# Patient Record
Sex: FEMALE | NOT HISPANIC OR LATINO | Employment: FULL TIME | URBAN - METROPOLITAN AREA
[De-identification: names, ages, dates, MRNs, and addresses within clinical notes are randomized per-mention and may not be internally consistent; named-entity substitution may affect disease eponyms.]

---

## 2017-07-15 ENCOUNTER — HOSPITAL ENCOUNTER (EMERGENCY)
Facility: CLINIC | Age: 52
Discharge: HOME | End: 2017-07-15
Attending: EMERGENCY MEDICINE | Admitting: EMERGENCY MEDICINE
Payer: COMMERCIAL

## 2017-07-15 ENCOUNTER — TRANSFERRED RECORDS (OUTPATIENT)
Dept: HEALTH INFORMATION MANAGEMENT | Facility: CLINIC | Age: 52
End: 2017-07-15

## 2017-07-15 VITALS
TEMPERATURE: 98.1 F | HEART RATE: 86 BPM | WEIGHT: 197 LBS | OXYGEN SATURATION: 98 % | SYSTOLIC BLOOD PRESSURE: 137 MMHG | RESPIRATION RATE: 14 BRPM | DIASTOLIC BLOOD PRESSURE: 90 MMHG

## 2017-07-15 DIAGNOSIS — R60.0 PERIPHERAL EDEMA: ICD-10-CM

## 2017-07-15 LAB
ANION GAP SERPL CALCULATED.3IONS-SCNC: 8 MMOL/L (ref 3–14)
BASOPHILS # BLD AUTO: 0 10E9/L (ref 0–0.2)
BASOPHILS NFR BLD AUTO: 0.4 %
BUN SERPL-MCNC: 11 MG/DL (ref 7–30)
CALCIUM SERPL-MCNC: 9.2 MG/DL (ref 8.5–10.1)
CHLORIDE SERPL-SCNC: 107 MMOL/L (ref 94–109)
CO2 SERPL-SCNC: 25 MMOL/L (ref 20–32)
CREAT SERPL-MCNC: 0.68 MG/DL (ref 0.52–1.04)
DIFFERENTIAL METHOD BLD: ABNORMAL
EOSINOPHIL # BLD AUTO: 0.1 10E9/L (ref 0–0.7)
EOSINOPHIL NFR BLD AUTO: 2.7 %
ERYTHROCYTE [DISTWIDTH] IN BLOOD BY AUTOMATED COUNT: 13.7 % (ref 10–15)
GFR SERPL CREATININE-BSD FRML MDRD: NORMAL ML/MIN/1.7M2
GLUCOSE SERPL-MCNC: 95 MG/DL (ref 70–99)
HCT VFR BLD AUTO: 36.6 % (ref 35–47)
HGB BLD-MCNC: 12.7 G/DL (ref 11.7–15.7)
IMM GRANULOCYTES # BLD: 0 10E9/L (ref 0–0.4)
IMM GRANULOCYTES NFR BLD: 0.4 %
LYMPHOCYTES # BLD AUTO: 0.9 10E9/L (ref 0.8–5.3)
LYMPHOCYTES NFR BLD AUTO: 34.1 %
MCH RBC QN AUTO: 28.8 PG (ref 26.5–33)
MCHC RBC AUTO-ENTMCNC: 34.7 G/DL (ref 31.5–36.5)
MCV RBC AUTO: 83 FL (ref 78–100)
MONOCYTES # BLD AUTO: 0.5 10E9/L (ref 0–1.3)
MONOCYTES NFR BLD AUTO: 18 %
NEUTROPHILS # BLD AUTO: 1.1 10E9/L (ref 1.6–8.3)
NEUTROPHILS NFR BLD AUTO: 44.4 %
NRBC # BLD AUTO: 0 10*3/UL
NRBC BLD AUTO-RTO: 0 /100
NT-PROBNP SERPL-MCNC: 40 PG/ML (ref 0–900)
PLATELET # BLD AUTO: 172 10E9/L (ref 150–450)
POTASSIUM SERPL-SCNC: 4 MMOL/L (ref 3.4–5.3)
RBC # BLD AUTO: 4.41 10E12/L (ref 3.8–5.2)
SODIUM SERPL-SCNC: 140 MMOL/L (ref 133–144)
TROPONIN I SERPL-MCNC: NORMAL UG/L (ref 0–0.04)
WBC # BLD AUTO: 2.6 10E9/L (ref 4–11)

## 2017-07-15 PROCEDURE — 83880 ASSAY OF NATRIURETIC PEPTIDE: CPT | Performed by: EMERGENCY MEDICINE

## 2017-07-15 PROCEDURE — 93005 ELECTROCARDIOGRAM TRACING: CPT

## 2017-07-15 PROCEDURE — 80048 BASIC METABOLIC PNL TOTAL CA: CPT | Performed by: EMERGENCY MEDICINE

## 2017-07-15 PROCEDURE — 84484 ASSAY OF TROPONIN QUANT: CPT | Performed by: EMERGENCY MEDICINE

## 2017-07-15 PROCEDURE — 85025 COMPLETE CBC W/AUTO DIFF WBC: CPT | Performed by: EMERGENCY MEDICINE

## 2017-07-15 PROCEDURE — 99284 EMERGENCY DEPT VISIT MOD MDM: CPT

## 2017-07-15 RX ORDER — LIDOCAINE 40 MG/G
CREAM TOPICAL
Status: DISCONTINUED | OUTPATIENT
Start: 2017-07-15 | End: 2017-07-15 | Stop reason: HOSPADM

## 2017-07-15 ASSESSMENT — ENCOUNTER SYMPTOMS
SHORTNESS OF BREATH: 0
CONSTITUTIONAL NEGATIVE: 1
LIGHT-HEADEDNESS: 1

## 2017-07-15 NOTE — ED PROVIDER NOTES
History     Chief Complaint:  Pedal edema    HPI   Fabienne Majano is a 52 year old female who presents from Urgent Care for evaluation of edema. When patient awoke this morning she noticed swelling to her bilateral hands and legs. She has had pedal edema in the past after traveling and sitting for awhile, but denies recent travel. Symptoms have resolved at time of evaluation here. The patient denies any chest pain, shortness of breath, palpitations, visual disturbance, or nausea. While at Urgent Care she felt mildly lightheaded and EKG showed new T wave inversions V3-6, so she was sent here. Her lightheadedness resolved quickly and was associated with no other symptoms. She voices no further complaints. Feels normal now. No hx of DVT/PE. Has had no exertional symptoms in last several weeks.       CARDIAC RISK FACTORS:  Sex:    female  Tobacco/Illicit drugs: negative   Hypertension:   negative   Hyperlipidemia:  negative   Diabetes:   negative   Family History:  positive for heart disease in mother   Personal History: negative      PE/DVT RISK FACTORS:  Sex:    female   Hormones:   negative   Tobacco:   negative   Cancer:   negative   Travel:   negative   Surgery:   negative   Other immobilization: negative   Personal history:  negative   Family history:  negative       Allergies:  Oxycodone-acetaminophen (nausea only)    Medications:    Zyrtec  Fluticasone     Past Medical History:    Menorrhagia  Pulmonary nodule    Past Surgical History:      Tubal ligation    Family History:    Heart disease (Mother in 40s-50s)    Social History:  The patient denies tobacco, alcohol, or drug use.          Review of Systems   Constitutional: Negative.    Respiratory: Negative for shortness of breath.    Cardiovascular: Negative for chest pain.        Bilateral hand and foot swelling -- resolved; see HPI   Neurological: Positive for light-headedness (resolved).   All other systems reviewed and are negative.      Physical  Exam   First Vitals:  BP: 142/80  Pulse: 86  Heart Rate: 86  Temp: 98.1  F (36.7  C)  Resp: 14  Weight: 89.4 kg (197 lb)  SpO2: 99 %    Physical Exam  General: Resting comfortably on the gurney  Eyes:  The pupils are equal and round  ENT:    Moist mucous membranes  Neck:  Normal range of motion  CV:  Regular rate and rhythm    Skin warm and well perfused   Resp:  Lungs are clear    Non-labored    No rales    No wheezing   GI:  Abdomen is soft, there is no rigidity    No distension    No rebound tenderness     No abdominal tenderness  MS:  Normal muscular tone    No asymmetric leg swelling    No edema visualized   Skin:  No rash or acute skin lesions noted  Neuro:   Awake, alert.      Speech is normal and fluent.    Face is symmetric.     Moves all extremities  Psych:  Normal affect.  Appropriate interactions.    Emergency Department Course   ECG:  ECG (12:04:05):  Indication: leg swelling   Rate 83 bpm. NJ interval 150. QRS duration 82. QT/QTc 354/415. P-R-T axes 48, 68, 2.   normal sinus rhythm  ST & T wave abnormality, consider inferior ischemia.  ST & T wave abnormality, consider anterior ischemia  Abnormal ECG  When compared with ECG from 10/30/03, T wave inversion in V3-V6 now present   Interpreted at 1232 by Isabelle King MD.     Laboratory:  CBC w/diff: WBC 2.6 (L), ANC 1.1 (L), o/w WNL (Hgb 12.7, Plt 172)  BMP: WNL (Cr 0.68)   Troponin-i (at 1226): <0.015   BNP: 40 (WNL)     Emergency Department Course:  Past medical records, nursing notes, and vitals reviewed.  1233: I performed an exam of the patient as documented above.   Peripheral IV access established. Blood drawn and sent. The above EKG and labs were obtained.  1255: Discussed case with nursing staff at the Urgent Care from which patient arrived.  Clinical findings and plan explained to the patient . Patient discharged home with instructions regarding supportive care, medications, and reasons to return as well as the importance of close  follow-up were reviewed.      Impression & Plan    Medical Decision Making:  Fabienne Majano is a 52 year old female who presented to the ED with leg/hand swelling. Vital signs within normal limits. She was concerned about bilateral hand and leg swelling though on evaluation I cannot visualize any swelling and she reports it seems to have gone back to normal. Clinic was concerned about EKG changes. I do note on her EKG that there are subtle T wave inversions in V3-V6 but our last EKG was from early 2000s and no other more recent EKGs. She does not have any chest pain or shortness of breath and does not report any exertional symptoms either to suggest ACS. Labs obtained and her white blood cell count is slightly low at 2.6. Reports history in past of low WBC. BNP, troponin, and BMP are all unremarkable. I do not suspect DVT given the symmetric nature of the swelling and this has now resolved. Recommended follow up with PCP. Unlikely CHF as well given nl BNP, no shortness of breath, no history of this. Reasons to return to ED were discussed with patient.       Diagnosis:    ICD-10-CM    1. Peripheral edema R60.9        Disposition:  discharged to home    Francisco HIDALGO, néstor serving as a scribe at 12:31 PM on 7/15/2017 to document services personally performed by Isabelle King MD based on my observations and the provider's statements to me.      Francisco Alas  7/15/2017    EMERGENCY DEPARTMENT     Isabelle King MD  07/15/17 [id number]

## 2017-07-15 NOTE — ED AVS SNAPSHOT
Emergency Department    [address] [facility] [address]    Phone:  [phone]    Fax:  [phone]                                       Fabienne Majano   MRN: [id number]    Department:   Emergency Department   Date of Visit:  7/15/2017           After Visit Summary Signature Page     I have received my discharge instructions, and my questions have been answered. I have discussed any challenges I see with this plan with the nurse or doctor.    ..........................................................................................................................................  Patient/Patient Representative Signature      ..........................................................................................................................................  Patient Representative Print Name and Relationship to Patient    ..................................................               ................................................  Date                                            Time    ..........................................................................................................................................  Reviewed by Signature/Title    ...................................................              ..............................................  Date                                                            Time

## 2017-07-15 NOTE — DISCHARGE INSTRUCTIONS
Follow up with primary care provider to get the WBC count rechecked    If you do have swelling, elevate your legs        Leg Swelling in Both Legs    Swelling of the feet, ankles, and legs is called edema. It is caused by excess fluid that has collected in the tissues. Extra fluid in the body settles in the lowest part because of gravity. This is why the legs and feet are most affected.  Some of the causes for edema include:    Disease of the heart like congestive heart failure    Standing or sitting for long periods of time    Infection of the feet or legs    Blood pooling in the veins of your legs (venous insufficiency)    Dilated veins in your lower leg (varicose veins)    Garters or other clothing that is tight on your legs. This will cause blood to pool in your legs because the clothing limits blood flow.    Some medicines such as hormones like birth control pills, some blood pressure medicines like calcium channel blockers (amlodipine) and steroids, some antidepressants like MAO inhibitors and tricyclics    Menstrual periods that cause you to retain fluids    Many types of renal disease    Liver failure or cirrhosis    Pregnancy, some swelling is normal, but a sudden increase in leg swelling or weight gain can be a sign of a dangerous complication of pregnancy    Poor nutrition    Thyroid disease  Medical treatment will depend on what is causing the swelling in your legs. Your healthcare provider may prescribe water pills (diuretics) to get rid of the extra fluid.  Home care  Follow these guidelines when caring for yourself at home:    Don't wear clothing like garters that is tight on your legs.    Keep your legs up while lying or sitting.    If infection, injury, or recent surgery is causing the swelling, stay off your legs as much as possible until symptoms get better.    If your healthcare provider says that your leg swelling is caused by venous insufficiency or varicose veins, don't sit or  one  place for long periods of time. Take breaks and walk about every few hours. Brisk walking is a good exercise. It helps circulate the blood that has collected in your leg. Talk with your provider about using support stockings to stop daytime leg swelling.    If your provider says that heart disease is causing your leg swelling, follow a low-salt diet to stop extra fluid from staying in your body. You may also need medicine.  Follow-up care  Follow up with your healthcare provider, or as advised.  When to seek medical advice  Call your healthcare provider right away if any of these occur:    New shortness of breath or chest pain    Shortness of breath or chest pain that gets worse    Swelling in both legs or ankles that gets worse    Swelling of the abdomen    Redness, warmth, or swelling in one leg    Fever of 100.4 F (38 C) or higher, or as directed by your healthcare provider    Yellow color to your skin or eyes    Rapid, unexplained weight gain    Having to sleep upright or use an increased number of pillows  Date Last Reviewed: 3/31/2016    4786-0172 [employer]. [address] [place] [address]129. All rights reserved. This information is not intended as a substitute for professional medical care. Always follow your healthcare professional's instructions.

## 2017-07-15 NOTE — ED AVS SNAPSHOT
Emergency Department    [address] [facility] [address]    Phone:  [phone]    Fax:  [phone]                                       Fabienne Majano   MRN: [id number]    Department:   Emergency Department   Date of Visit:  7/15/2017           Patient Information     Date Of Birth          1965        Your diagnoses for this visit were:     Peripheral edema        You were seen by Isabelle King MD.      Follow-up Information     Follow up with Sindhu Butts PA-C.    Specialty:  Physician Assistant    Contact information:    [facility]  [address]  [facility] [address] [phone]          Follow up with  Emergency Department.    Specialty:  EMERGENCY MEDICINE    Why:  If symptoms worsen    Contact information:    [address] [facility] [address] [phone]        Discharge Instructions       Follow up with primary care provider to get the WBC count rechecked    If you do have swelling, elevate your legs        Leg Swelling in Both Legs    Swelling of the feet, ankles, and legs is called edema. It is caused by excess fluid that has collected in the tissues. Extra fluid in the body settles in the lowest part because of gravity. This is why the legs and feet are most affected.  Some of the causes for edema include:    Disease of the heart like congestive heart failure    Standing or sitting for long periods of time    Infection of the feet or legs    Blood pooling in the veins of your legs (venous insufficiency)    Dilated veins in your lower leg (varicose veins)    Garters or other clothing that is tight on your legs. This will cause blood to pool in your legs because the clothing limits blood flow.    Some medicines such as hormones like birth control pills, some blood pressure medicines like calcium channel blockers (amlodipine) and steroids, some antidepressants like MAO inhibitors and tricyclics    Menstrual periods that cause you to retain  fluids    Many types of renal disease    Liver failure or cirrhosis    Pregnancy, some swelling is normal, but a sudden increase in leg swelling or weight gain can be a sign of a dangerous complication of pregnancy    Poor nutrition    Thyroid disease  Medical treatment will depend on what is causing the swelling in your legs. Your healthcare provider may prescribe water pills (diuretics) to get rid of the extra fluid.  Home care  Follow these guidelines when caring for yourself at home:    Don't wear clothing like garters that is tight on your legs.    Keep your legs up while lying or sitting.    If infection, injury, or recent surgery is causing the swelling, stay off your legs as much as possible until symptoms get better.    If your healthcare provider says that your leg swelling is caused by venous insufficiency or varicose veins, don't sit or  one place for long periods of time. Take breaks and walk about every few hours. Brisk walking is a good exercise. It helps circulate the blood that has collected in your leg. Talk with your provider about using support stockings to stop daytime leg swelling.    If your provider says that heart disease is causing your leg swelling, follow a low-salt diet to stop extra fluid from staying in your body. You may also need medicine.  Follow-up care  Follow up with your healthcare provider, or as advised.  When to seek medical advice  Call your healthcare provider right away if any of these occur:    New shortness of breath or chest pain    Shortness of breath or chest pain that gets worse    Swelling in both legs or ankles that gets worse    Swelling of the abdomen    Redness, warmth, or swelling in one leg    Fever of 100.4 F (38 C) or higher, or as directed by your healthcare provider    Yellow color to your skin or eyes    Rapid, unexplained weight gain    Having to sleep upright or use an increased number of pillows  Date Last Reviewed: 3/31/2016    0079-1362 The  [employer]. [address] [place] [address]993. All rights reserved. This information is not intended as a substitute for professional medical care. Always follow your healthcare professional's instructions.          24 Hour Appointment Hotline       To make an appointment at any [facility], call [phone] ([phone]). If you don't have a family doctor or clinic, we will help you find one. [facility] are conveniently located to serve the needs of you and your family.             Review of your medicines      Notice     You have not been prescribed any medications.            Procedures and tests performed during your visit     Basic metabolic panel    CBC with platelets differential    Nt probnp inpatient    Peripheral IV catheter    Troponin I      Orders Needing Specimen Collection     None      Pending Results     No orders found from 7/13/2017 to 7/16/2017.            Pending Culture Results     No orders found from 7/13/2017 to 7/16/2017.            Pending Results Instructions     If you had any lab results that were not finalized at the time of your Discharge, you can call the ED Lab Result RN at [phone]. You will be contacted by this team for any positive Lab results or changes in treatment. The nurses are available 7 days a week from 10A to 6:30P.  You can leave a message 24 hours per day and they will return your call.        Test Results From Your Hospital Stay        7/15/2017 12:47 PM      Component Results     Component Value Ref Range & Units Status    WBC 2.6 (L) 4.0 - 11.0 10e9/L Final    RBC Count 4.41 3.8 - 5.2 10e12/L Final    Hemoglobin 12.7 11.7 - 15.7 g/dL Final    Hematocrit 36.6 35.0 - 47.0 % Final    MCV 83 78 - 100 fl Final    MCH 28.8 26.5 - 33.0 pg Final    MCHC 34.7 31.5 - 36.5 g/dL Final    RDW 13.7 10.0 - 15.0 % Final    Platelet Count 172 150 - 450 10e9/L Final    Diff Method Automated Method  Final    % Neutrophils 44.4 % Final    %  Lymphocytes 34.1 % Final    % Monocytes 18.0 % Final    % Eosinophils 2.7 % Final    % Basophils 0.4 % Final    % Immature Granulocytes 0.4 % Final    Nucleated RBCs 0 0 /100 Final    Absolute Neutrophil 1.1 (L) 1.6 - 8.3 10e9/L Final    Absolute Lymphocytes 0.9 0.8 - 5.3 10e9/L Final    Absolute Monocytes 0.5 0.0 - 1.3 10e9/L Final    Absolute Eosinophils 0.1 0.0 - 0.7 10e9/L Final    Absolute Basophils 0.0 0.0 - 0.2 10e9/L Final    Abs Immature Granulocytes 0.0 0 - 0.4 10e9/L Final    Absolute Nucleated RBC 0.0  Final         7/15/2017  1:02 PM      Component Results     Component Value Ref Range & Units Status    Sodium 140 133 - 144 mmol/L Final    Potassium 4.0 3.4 - 5.3 mmol/L Final    Chloride 107 94 - 109 mmol/L Final    Carbon Dioxide 25 20 - 32 mmol/L Final    Anion Gap 8 3 - 14 mmol/L Final    Glucose 95 70 - 99 mg/dL Final    Urea Nitrogen 11 7 - 30 mg/dL Final    Creatinine 0.68 0.52 - 1.04 mg/dL Final    GFR Estimate >90  Non  GFR Calc   >60 mL/min/1.7m2 Final    GFR Estimate If Black >90   GFR Calc   >60 mL/min/1.7m2 Final    Calcium 9.2 8.5 - 10.1 mg/dL Final         7/15/2017  1:07 PM      Component Results     Component Value Ref Range & Units Status    Troponin I ES  0.000 - 0.045 ug/L Final    <0.015  The 99th percentile for upper reference range is 0.045 ug/L.  Troponin values in   the range of 0.045 - 0.120 ug/L may be associated with risks of adverse   clinical events.           7/15/2017  1:07 PM      Component Results     Component Value Ref Range & Units Status    N-Terminal Pro BNP Inpatient 40 0 - 900 pg/mL Final    Reference range shown and results flagged as abnormal are suggested inpatient   cut points for confirming diagnosis if CHF in an acute setting. Establishing   a   baseline value for each individual patient is useful for follow-up. An   inpatient or emergency department NT-proPBNP <300 pg/mL effectively rules out   acute CHF, with 99% negative  predictive value.  The outpatient non-acute reference range for ruling out CHF is:   0-125 pg/mL (age 18 to less than 75)   0-450 pg/mL (age 75 yrs and older)                  Clinical Quality Measure: Blood Pressure Screening     Your blood pressure was checked while you were in the emergency department today. The last reading we obtained was  BP: 128/83 . Please read the guidelines below about what these numbers mean and what you should do about them.  If your systolic blood pressure (the top number) is less than 120 and your diastolic blood pressure (the bottom number) is less than 80, then your blood pressure is normal. There is nothing more that you need to do about it.  If your systolic blood pressure (the top number) is 120-139 or your diastolic blood pressure (the bottom number) is 80-89, your blood pressure may be higher than it should be. You should have your blood pressure rechecked within a year by a primary care provider.  If your systolic blood pressure (the top number) is 140 or greater or your diastolic blood pressure (the bottom number) is 90 or greater, you may have high blood pressure. High blood pressure is treatable, but if left untreated over time it can put you at risk for heart attack, stroke, or kidney failure. You should have your blood pressure rechecked by a primary care provider within the next 4 weeks.  If your provider in the emergency department today gave you specific instructions to follow-up with your doctor or provider even sooner than that, you should follow that instruction and not wait for up to 4 weeks for your follow-up visit.        Thank you for choosing [facility]       Thank you for choosing [facility] for your care. Our goal is always to provide you with excellent care. Hearing back from our patients is one way we can continue to improve our services. Please take a few minutes to complete the written survey that you may receive in the mail after you visit with us. Thank  "you!        [employer] Information     [employer] lets you send messages to your doctor, view your test results, renew your prescriptions, schedule appointments and more. To sign up, go to [email or web address] . Click on \"Log in\" on the left side of the screen, which will take you to the Welcome page. Then click on \"Sign up Now\" on the right side of the page.     You will be asked to enter the access code listed below, as well as some personal information. Please follow the directions to create your username and password.     Your access code is: [email or web address]  Expires: 10/13/2017  1:47 PM     Your access code will  in 90 days. If you need help or a new code, please call your [facility] clinic or [phone].        Care EveryWhere ID     This is your Care EveryWhere ID. This could be used by other organizations to access your [facility] medical records  [id number]        Equal Access to Services     NIRAJ ZAMORA : Hadii donovan velasquezo Sonury, waaxda luqadaha, qaybta kaalmada adealisonyaisreal, leah mcallister . So [facility] [phone].    ATENCIÓN: Si habla español, tiene a bryant disposición servicios gratuitos de asistencia lingüística. Llame al [phone].    We comply with applicable federal civil rights laws and [place] laws. We do not discriminate on the basis of race, color, national origin, age, disability sex, sexual orientation or gender identity.            After Visit Summary       This is your record. Keep this with you and show to your community pharmacist(s) and doctor(s) at your next visit.                  "

## 2021-09-01 ENCOUNTER — APPOINTMENT (OUTPATIENT)
Dept: GENERAL RADIOLOGY | Facility: CLINIC | Age: 56
End: 2021-09-01
Attending: EMERGENCY MEDICINE
Payer: COMMERCIAL

## 2021-09-01 ENCOUNTER — HOSPITAL ENCOUNTER (EMERGENCY)
Facility: CLINIC | Age: 56
Discharge: HOME | End: 2021-09-01
Attending: EMERGENCY MEDICINE | Admitting: EMERGENCY MEDICINE
Payer: COMMERCIAL

## 2021-09-01 VITALS
SYSTOLIC BLOOD PRESSURE: 132 MMHG | BODY MASS INDEX: 32.95 KG/M2 | WEIGHT: 205 LBS | DIASTOLIC BLOOD PRESSURE: 85 MMHG | OXYGEN SATURATION: 98 % | HEIGHT: 66 IN | RESPIRATION RATE: 18 BRPM | HEART RATE: 73 BPM | TEMPERATURE: 97 F

## 2021-09-01 DIAGNOSIS — R51.9 FACIAL PAIN: ICD-10-CM

## 2021-09-01 DIAGNOSIS — R05.9 COUGH: ICD-10-CM

## 2021-09-01 DIAGNOSIS — R94.31 ABNORMAL ELECTROCARDIOGRAM: ICD-10-CM

## 2021-09-01 LAB
ALBUMIN SERPL-MCNC: 3.9 G/DL (ref 3.4–5)
ALP SERPL-CCNC: 64 U/L (ref 40–150)
ALT SERPL W P-5'-P-CCNC: 30 U/L (ref 0–50)
ANION GAP SERPL CALCULATED.3IONS-SCNC: 3 MMOL/L (ref 3–14)
AST SERPL W P-5'-P-CCNC: 17 U/L (ref 0–45)
ATRIAL RATE - MUSE: 69 BPM
ATRIAL RATE - MUSE: 73 BPM
BASOPHILS # BLD AUTO: 0 10E3/UL (ref 0–0.2)
BASOPHILS NFR BLD AUTO: 1 %
BILIRUB SERPL-MCNC: 0.5 MG/DL (ref 0.2–1.3)
BUN SERPL-MCNC: 15 MG/DL (ref 7–30)
CALCIUM SERPL-MCNC: 9 MG/DL (ref 8.5–10.1)
CHLORIDE BLD-SCNC: 109 MMOL/L (ref 94–109)
CO2 SERPL-SCNC: 27 MMOL/L (ref 20–32)
CREAT SERPL-MCNC: 0.71 MG/DL (ref 0.52–1.04)
DIASTOLIC BLOOD PRESSURE - MUSE: NORMAL MMHG
DIASTOLIC BLOOD PRESSURE - MUSE: NORMAL MMHG
EOSINOPHIL # BLD AUTO: 0.1 10E3/UL (ref 0–0.7)
EOSINOPHIL NFR BLD AUTO: 3 %
ERYTHROCYTE [DISTWIDTH] IN BLOOD BY AUTOMATED COUNT: 13.7 % (ref 10–15)
GFR SERPL CREATININE-BSD FRML MDRD: >90 ML/MIN/1.73M2
GLUCOSE BLD-MCNC: 100 MG/DL (ref 70–99)
HCT VFR BLD AUTO: 37.2 % (ref 35–47)
HGB BLD-MCNC: 12.1 G/DL (ref 11.7–15.7)
HOLD SPECIMEN: NORMAL
HOLD SPECIMEN: NORMAL
IMM GRANULOCYTES # BLD: 0 10E3/UL
IMM GRANULOCYTES NFR BLD: 0 %
INTERPRETATION ECG - MUSE: NORMAL
INTERPRETATION ECG - MUSE: NORMAL
LYMPHOCYTES # BLD AUTO: 1.3 10E3/UL (ref 0.8–5.3)
LYMPHOCYTES NFR BLD AUTO: 37 %
MCH RBC QN AUTO: 26.9 PG (ref 26.5–33)
MCHC RBC AUTO-ENTMCNC: 32.5 G/DL (ref 31.5–36.5)
MCV RBC AUTO: 83 FL (ref 78–100)
MONOCYTES # BLD AUTO: 0.5 10E3/UL (ref 0–1.3)
MONOCYTES NFR BLD AUTO: 14 %
NEUTROPHILS # BLD AUTO: 1.6 10E3/UL (ref 1.6–8.3)
NEUTROPHILS NFR BLD AUTO: 45 %
NRBC # BLD AUTO: 0 10E3/UL
NRBC BLD AUTO-RTO: 0 /100
P AXIS - MUSE: 53 DEGREES
P AXIS - MUSE: 64 DEGREES
PLATELET # BLD AUTO: 196 10E3/UL (ref 150–450)
POTASSIUM BLD-SCNC: 3.6 MMOL/L (ref 3.4–5.3)
PR INTERVAL - MUSE: 174 MS
PR INTERVAL - MUSE: 182 MS
PROT SERPL-MCNC: 8 G/DL (ref 6.8–8.8)
QRS DURATION - MUSE: 108 MS
QRS DURATION - MUSE: 92 MS
QT - MUSE: 398 MS
QT - MUSE: 404 MS
QTC - MUSE: 432 MS
QTC - MUSE: 438 MS
R AXIS - MUSE: 63 DEGREES
R AXIS - MUSE: 66 DEGREES
RBC # BLD AUTO: 4.49 10E6/UL (ref 3.8–5.2)
SARS-COV-2 RNA RESP QL NAA+PROBE: NEGATIVE
SODIUM SERPL-SCNC: 139 MMOL/L (ref 133–144)
SYSTOLIC BLOOD PRESSURE - MUSE: NORMAL MMHG
SYSTOLIC BLOOD PRESSURE - MUSE: NORMAL MMHG
T AXIS - MUSE: -17 DEGREES
T AXIS - MUSE: -19 DEGREES
TROPONIN I SERPL-MCNC: <0.015 UG/L (ref 0–0.04)
TROPONIN I SERPL-MCNC: <0.015 UG/L (ref 0–0.04)
VENTRICULAR RATE- MUSE: 69 BPM
VENTRICULAR RATE- MUSE: 73 BPM
WBC # BLD AUTO: 3.6 10E3/UL (ref 4–11)

## 2021-09-01 PROCEDURE — 93005 ELECTROCARDIOGRAM TRACING: CPT

## 2021-09-01 PROCEDURE — 36415 COLL VENOUS BLD VENIPUNCTURE: CPT | Performed by: EMERGENCY MEDICINE

## 2021-09-01 PROCEDURE — 93005 ELECTROCARDIOGRAM TRACING: CPT | Mod: 76

## 2021-09-01 PROCEDURE — 87635 SARS-COV-2 COVID-19 AMP PRB: CPT | Performed by: EMERGENCY MEDICINE

## 2021-09-01 PROCEDURE — 250N000013 HC RX MED GY IP 250 OP 250 PS 637: Performed by: EMERGENCY MEDICINE

## 2021-09-01 PROCEDURE — 80053 COMPREHEN METABOLIC PANEL: CPT | Performed by: EMERGENCY MEDICINE

## 2021-09-01 PROCEDURE — 99285 EMERGENCY DEPT VISIT HI MDM: CPT | Mod: 25

## 2021-09-01 PROCEDURE — 85025 COMPLETE CBC W/AUTO DIFF WBC: CPT | Performed by: EMERGENCY MEDICINE

## 2021-09-01 PROCEDURE — 84484 ASSAY OF TROPONIN QUANT: CPT | Mod: 91 | Performed by: EMERGENCY MEDICINE

## 2021-09-01 PROCEDURE — 71045 X-RAY EXAM CHEST 1 VIEW: CPT

## 2021-09-01 PROCEDURE — C9803 HOPD COVID-19 SPEC COLLECT: HCPCS

## 2021-09-01 RX ORDER — ACETAMINOPHEN 500 MG
1000 TABLET ORAL ONCE
Status: COMPLETED | OUTPATIENT
Start: 2021-09-01 | End: 2021-09-01

## 2021-09-01 RX ADMIN — ACETAMINOPHEN 1000 MG: 500 TABLET, FILM COATED ORAL at 08:28

## 2021-09-01 ASSESSMENT — ENCOUNTER SYMPTOMS
HEADACHES: 1
SORE THROAT: 1
NECK PAIN: 1

## 2021-09-01 ASSESSMENT — MIFFLIN-ST. JEOR: SCORE: 1546.62

## 2021-09-01 NOTE — ED TRIAGE NOTES
Patient reports pain behind right ear that radiates into her shoulder. Patient also complains cough and plugged nose. States has a scratchy throat. Symptoms started yesterday.

## 2021-09-01 NOTE — ED PROVIDER NOTES
"  History   Chief Complaint:  Neck Pain and Cough     HPI   Fabienne Majano is a 56 year old female with history of hypertension who presents with neck pain and a cough.  The patient reports that yesterday at work she began having a pain behind her right ear that radiates down to her right shoulder. This pain is worsened with movement, but has not other exacerbating symptoms. She also notes having a scratchy throat this morning as well as a headache and congestion in her left nostril. The patient is vaccinated against COVID-19 and had her last vaccination dose in February of this year. Of note, the patient notes that she has been under a lot of stress as her brother is out of state and currently on hospice care, but she is struggling to find a way to see him.     Review of Systems   HENT: Positive for congestion and sore throat (scratchy).    Musculoskeletal: Positive for neck pain (Right side).   Neurological: Positive for headaches.   All other systems reviewed and are negative.        Allergies:  Atorvastatin  Hydrocodone bitartrate  Oxycodone    Medications:  Norvasc  Aspirin 81 mg   Zyrtec  Albuterol    Past Medical History:    Hypertension  Pulmonary nodule  Menorrhagia  Uterine fibroid     Past Surgical History:    Festus teeth extraction  Hysterectomy     Family History:    Brother: Crohn's disease, heart attack  Father: diabetes  Mother: diabetes, stroke  Sister: hypertension    Social History:  The patient presents alone.  She works as a pharmacy technician.    Physical Exam     Patient Vitals for the past 24 hrs:   BP Temp Temp src Pulse Resp SpO2 Height Weight   09/01/21 1123 -- -- -- -- -- 98 % -- --   09/01/21 1100 126/77 -- -- 75 18 98 % -- --   09/01/21 1031 -- -- -- -- -- 97 % -- --   09/01/21 1030 137/88 -- -- 68 -- -- -- --   09/01/21 1000 130/84 -- -- 71 -- 96 % -- --   09/01/21 0900 120/79 -- -- 75 -- 96 % -- --   09/01/21 0801 129/88 97  F (36.1  C) Temporal 79 16 99 % 1.676 m (5' 6\") 93 kg (205 " lb)       Physical Exam  Vitals: reviewed by me  General: Pt seen on hospital [facility], cooperative, and alert to conversation  Eyes: Tracking well, clear conjunctiva BL  ENT: MMM, midline trachea.  Full range of motion to neck, no tenderness to palpation of the mastoid process on the right.  Neck is soft and supple with no skin changes, and no tenderness.  No masses.  Lungs: No tachypnea, no accessory muscle use. No respiratory distress.   CV: Rate as above  Abd: Soft, non tender, no guarding, no rebound. Non distended  MSK: no joint effusion.  No evidence of trauma  Skin: No rash  Neuro: Clear speech and no facial droop.  Psych: Not RIS, no e/o AH/VH      Emergency Department Course   ECG  ECG taken at 0930, ECG read at 0932  Normal sinus rhythm  Incomplete right bundle branch block  T wave abnormality, consider inferior ischemia  T wave abnormality, consider anterolateral ischemia   Rate 73 bpm. WV interval 174 ms. QRS duration 108 ms. QT/QTc 398/438 ms. P-R-T axes 64 66 -17.     ECG #2  ECG taken at 1110, ECG read at 1125  Normal sinus rhythm  Incomplete right bundle branch block  St & T wave abnormality, consider inferior ischemia  ST & T wave abnormality, consider anterolateral ischemia    Rate 69 bpm. WV interval 182 ms. QRS duration 92 ms. QT/QTc 404/432 ms. P-R-T axes 53 63 -19.     Imaging:  XR Chest 2 Views  Portable AP view of the chest was obtained.  Cardiomediastinal silhouette is within normal limits. No suspicious  focal pulmonary opacities. No significant pleural effusion or  pneumothorax.  Reading per radiology.    Laboratory:  CBC: WBC 3.6 (L), HGB 12.1,    CMP: Glucose 100 (H) o/w WNL (Creatinine 0.71)     Troponin (Collected 0820): <0.015   Troponin (Collected 1039): <0.015     Symptomatic Influenza A/B & SARS-CoV2 (COVID19) Virus PCR Multiplex: Negative       Emergency Department Course:    Reviewed:  I reviewed nursing notes, vitals and past medical  history    Assessments:  0815 I obtained history and examined the patient as noted above.   1124 I rechecked the patient and explained findings. On reassessment the patient is completely pain free.     Interventions:  0828 Tylenol 1000 mg Oral     Disposition:  The patient was discharged to home.       Impression & Plan     Medical Decision Making:  This is a pleasant 56-year-old female presents to emergency room with right-sided postauricular pain, and neck pain.  She also has a cough, and some of the symptoms I don't necessarily go with a cardiac presentation, but that was my main concern today.  Her EKG does have significant ST depression in the lateral leads, although this seems to be nondynamic, and actually was present to a slightly lesser degree on EKG in 2017.  Importantly she also has no chest pain, no exertional symptoms, and negative troponins x2 after over 24 hours of symptoms.  I do think that she is stable for outpatient management, and while the patient herself thinks that this is all stress related to her brother being put on hospice in [place], I have implored her to see her regular doctor for a cardiac stress test in the next 1 week.  She feels comfortable with this plan, tells me she'll come back if things get worse, and understands that no clear cause of her pain was found today, though she may simply have a viral syndrome causing some of the congestion and ear pain.  Her tympanic membranes unremarkable, she has no tenderness to the mastoid process itself, effectively ruling out mastoiditis.  I do think she stable for discharge as above, all of her questions were answered.    Covid-19  Fabienne Majano was evaluated during a global COVID-19 pandemic, which necessitated consideration that the patient might be at risk for infection with the SARS-CoV-2 virus that causes COVID-19.   Applicable protocols for evaluation were followed during the patient's care.   COVID-19 was considered as part of the  patient's evaluation. The plan for testing is:  a test was obtained during this visit.    Diagnosis:    ICD-10-CM    1. Abnormal electrocardiogram  R94.31    2. Facial pain  R51.9    3. Cough  R05        Discharge Medications:  None     Scribe Disclosure:  I, Kenneth Eldridge, am serving as a scribe at 8:05 AM on 9/1/2021 to document services personally performed by Pramod Turner* based on my observations and the provider's statements to me.              Pramod Turner MD  09/01/21 1311

## 2021-09-01 NOTE — DISCHARGE INSTRUCTIONS
As we discussed, no clear cause of your ear pain or cough or neck pain was found today.  We did undertake a thorough cardiac work-up here, and while you have no evidence of cardiac damage at this time, your EKG was abnormal and needs to be followed up on.  Based on our records, it looks to be similar to 2017, which is reassuring, but still you need to follow-up in the next 1 week, this is very important.  Come back to the ER immediately with with any chest pain, or with any pain that returns as you told me you are currently chest pain and neck pain-free at this time.  Come back with any other concerns.

## 2022-01-02 ENCOUNTER — HOSPITAL ENCOUNTER (EMERGENCY)
Facility: CLINIC | Age: 57
Discharge: HOME | End: 2022-01-02
Attending: EMERGENCY MEDICINE | Admitting: EMERGENCY MEDICINE
Payer: COMMERCIAL

## 2022-01-02 VITALS
SYSTOLIC BLOOD PRESSURE: 121 MMHG | BODY MASS INDEX: 34.66 KG/M2 | WEIGHT: 203 LBS | OXYGEN SATURATION: 99 % | DIASTOLIC BLOOD PRESSURE: 64 MMHG | TEMPERATURE: 97.4 F | HEIGHT: 64 IN | RESPIRATION RATE: 20 BRPM | HEART RATE: 85 BPM

## 2022-01-02 DIAGNOSIS — M54.50 ACUTE MIDLINE LOW BACK PAIN WITHOUT SCIATICA: ICD-10-CM

## 2022-01-02 PROCEDURE — 99283 EMERGENCY DEPT VISIT LOW MDM: CPT

## 2022-01-02 PROCEDURE — 250N000013 HC RX MED GY IP 250 OP 250 PS 637: Performed by: EMERGENCY MEDICINE

## 2022-01-02 RX ORDER — LIDOCAINE 4 G/G
1 PATCH TOPICAL
Status: DISCONTINUED | OUTPATIENT
Start: 2022-01-02 | End: 2022-01-02 | Stop reason: HOSPADM

## 2022-01-02 RX ORDER — IBUPROFEN 600 MG/1
600 TABLET, FILM COATED ORAL ONCE
Status: COMPLETED | OUTPATIENT
Start: 2022-01-02 | End: 2022-01-02

## 2022-01-02 RX ORDER — LIDOCAINE 4 G/G
1 PATCH TOPICAL EVERY 24 HOURS
Qty: 15 PATCH | Refills: 0 | Status: SHIPPED | OUTPATIENT
Start: 2022-01-02

## 2022-01-02 RX ORDER — KETOROLAC TROMETHAMINE 10 MG/1
10 TABLET, FILM COATED ORAL EVERY 6 HOURS PRN
Qty: 20 TABLET | Refills: 0 | Status: SHIPPED | OUTPATIENT
Start: 2022-01-02

## 2022-01-02 RX ORDER — ACETAMINOPHEN 325 MG/1
650 TABLET ORAL EVERY 6 HOURS PRN
Qty: 60 TABLET | Refills: 0 | Status: SHIPPED | OUTPATIENT
Start: 2022-01-02

## 2022-01-02 RX ADMIN — LIDOCAINE 1 PATCH: 560 PATCH PERCUTANEOUS; TOPICAL; TRANSDERMAL at 09:54

## 2022-01-02 RX ADMIN — IBUPROFEN 600 MG: 600 TABLET ORAL at 09:54

## 2022-01-02 ASSESSMENT — MIFFLIN-ST. JEOR: SCORE: 1505.8

## 2022-01-02 ASSESSMENT — ENCOUNTER SYMPTOMS
CONSTIPATION: 0
BACK PAIN: 1
DYSURIA: 0
HEMATURIA: 0
DIARRHEA: 0
BLOOD IN STOOL: 0
DIFFICULTY URINATING: 0
NUMBNESS: 0
FREQUENCY: 0

## 2022-01-02 NOTE — ED PROVIDER NOTES
History   Chief Complaint:  Back Pain       The history is provided by the patient.      Fabienne Majano is a 56 year old female with history of hypertension, thoracic back muscle spasm who presents with back pain. The patient reports that she began experiencing medial lower back pain about 2 days ago, which worsened today. She states that she took Cyclobenzaprine for her pain at home, which did not help. She has reportedly not taken Tylenol or Motrin at home today. Of note, the patient reports no history of cancer. She denies steroid use.  She denies blood thinner or IV drug use.  The patient endorses no recent heavy lifting or bending issues. She also notes no rectal numbness, bowel issues, or urinary symptoms.  No weakness in the legs either.  No trauma to the back.    Review of Systems   Gastrointestinal: Negative for blood in stool, constipation and diarrhea.   Genitourinary: Negative for decreased urine volume, difficulty urinating, dysuria, frequency, hematuria and urgency.   Musculoskeletal: Positive for back pain (lower).   Neurological: Negative for numbness (rectal).   All other systems reviewed and are negative.    Allergies:  Atorvastatin  Hydrocodone Bitartrate  Hydrocodone-Acetaminophen  Percocet [Oxycodone-Acetaminophen]    Medications:  Amlodipine Benzoate  Aspirin  Cetirizine    Past Medical History:     Hypertension    Iron deficiency anemia   Leukopenia  Menorrhagia  Pulmonary nodule  Thoracic back muscle spasm  Uterine fibroid    Past Surgical History:     section  Hysterectomy  Tubal ligation  Rosalie teeth extraction     Family History:    Mother: Diabetes, stroke  Father: Diabetes  Brother: Crohn's disease  Half-brother: MI  Sister: Hypertension    Social History:  Presents to the emergency department alone  Arrives via car; did not drive herself    Physical Exam     Patient Vitals for the past 24 hrs:   BP Temp Pulse Resp SpO2 Height Weight   22 0920 121/64 97.4  F (36.3  C) 85 20  "99 % 1.626 m (5' 4\") 92.1 kg (203 lb)       Physical Exam  VS: Reviewed per above  HENT: normal speech  EYES: sclera anicteric  CV: Rate as noted, regular rhythm.   RESP: Effort normal.   NEURO: Alert, moving all extremities with 5-5 strength, sensation intact to light touch in the bilateral lower extremities.  MSK: No deformity of the extremities.  Tenderness of low mid back.  SKIN: Warm and dry      Emergency Department Course     Emergency Department Course:  Reviewed:  I reviewed nursing notes, vitals, past medical history and Care Everywhere    Assessments:  0936 I obtained history and examined the patient as noted above.    Interventions:  0954 Lidocaine 1 patch TD  0954 Ibuprofen 600 mg PO    Disposition:  The patient was discharged to home.     Impression & Plan     Medical Decision Making:  Fabienne Majano presented with back pain.  The pain has improved with interventions in the emergency department. Pt did not sustain any focal trauma, therefore x-rays/CT are not necessary due to the low likelihood of fracture or subluxation. Pt has no back pain red flags on history or exam to suggest spinal cord compression syndrome (cauda equina syndrome, spinal/epidural space hematoma), spinal column infection (epidural abscess, discitis, osteomyelitis), nor malignancy/metastatic disease.     The neurological exam is normal and pt's symptoms are consistent with a musculoskeletal strain. There is no current evidence of radiculopathy or myelopathy. The patient will be discharged with lidocaine patches, Toradol, Tylenol to use as directed.      Fabienne was directed to avoid heavy lifting, bending or twisting. She  was told to return for:  increasing pain, numbness, weakness, or bowel or bladder dysfunction.  Pt  was advised to schedule follow-up with his/her primary doctor to re-assess symptoms.        Diagnosis:    ICD-10-CM    1. Acute midline low back pain without sciatica  M54.50        Discharge Medications:  New " Prescriptions    ACETAMINOPHEN (TYLENOL) 325 MG TABLET    Take 2 tablets (650 mg) by mouth every 6 hours as needed for mild pain    KETOROLAC (TORADOL) 10 MG TABLET    Take 1 tablet (10 mg) by mouth every 6 hours as needed for moderate pain    LIDOCAINE (LIDOCARE) 4 % PATCH    Place 1 patch onto the skin every 24 hours To prevent lidocaine toxicity, patient should be patch free for 12 hrs daily.       Scribe Disclosure:  I, Yaron Silva, am serving as a scribe at 9:36 AM on 1/2/2022 to document services personally performed by Mario Weeks MD based on my observations and the provider's statements to me.              Mario Weeks MD  01/02/22 [id number]

## 2022-01-02 NOTE — LETTER
January 2, 2022      To Whom It May Concern:      Fabienne Majano was seen in our Emergency Department today, 01/02/22. Please excuse her from work on 1/2 and 1/3 due to medical concern. She may return sooner if her symptoms are improved. Please allow her to avoid heavy lifting greater than 20 lbs or deep bending until she is cleared by her primary doctor.    Sincerely,        Mario Weeks MD

## 2022-11-20 ENCOUNTER — HEALTH MAINTENANCE LETTER (OUTPATIENT)
Age: 57
End: 2022-11-20

## 2023-06-04 ENCOUNTER — HOSPITAL ENCOUNTER (EMERGENCY)
Facility: CLINIC | Age: 58
Discharge: HOME | End: 2023-06-04
Attending: EMERGENCY MEDICINE | Admitting: EMERGENCY MEDICINE
Payer: COMMERCIAL

## 2023-06-04 ENCOUNTER — APPOINTMENT (OUTPATIENT)
Dept: MRI IMAGING | Facility: CLINIC | Age: 58
End: 2023-06-04
Attending: EMERGENCY MEDICINE
Payer: COMMERCIAL

## 2023-06-04 VITALS
DIASTOLIC BLOOD PRESSURE: 84 MMHG | SYSTOLIC BLOOD PRESSURE: 134 MMHG | HEART RATE: 67 BPM | HEIGHT: 67 IN | WEIGHT: 210 LBS | BODY MASS INDEX: 32.96 KG/M2 | OXYGEN SATURATION: 99 % | RESPIRATION RATE: 16 BRPM | TEMPERATURE: 97.8 F

## 2023-06-04 DIAGNOSIS — R20.2 PARESTHESIAS: ICD-10-CM

## 2023-06-04 DIAGNOSIS — R42 LIGHTHEADEDNESS: ICD-10-CM

## 2023-06-04 DIAGNOSIS — I10 HYPERTENSION, UNSPECIFIED TYPE: ICD-10-CM

## 2023-06-04 LAB
ANION GAP SERPL CALCULATED.3IONS-SCNC: 11 MMOL/L (ref 7–15)
ATRIAL RATE - MUSE: 73 BPM
BASOPHILS # BLD AUTO: 0 10E3/UL (ref 0–0.2)
BASOPHILS NFR BLD AUTO: 1 %
BUN SERPL-MCNC: 9.7 MG/DL (ref 6–20)
CALCIUM SERPL-MCNC: 9.5 MG/DL (ref 8.6–10)
CHLORIDE SERPL-SCNC: 104 MMOL/L (ref 98–107)
CREAT SERPL-MCNC: 0.68 MG/DL (ref 0.51–0.95)
DEPRECATED HCO3 PLAS-SCNC: 25 MMOL/L (ref 22–29)
DIASTOLIC BLOOD PRESSURE - MUSE: NORMAL MMHG
EOSINOPHIL # BLD AUTO: 0.1 10E3/UL (ref 0–0.7)
EOSINOPHIL NFR BLD AUTO: 4 %
ERYTHROCYTE [DISTWIDTH] IN BLOOD BY AUTOMATED COUNT: 13.3 % (ref 10–15)
GFR SERPL CREATININE-BSD FRML MDRD: >90 ML/MIN/1.73M2
GLUCOSE SERPL-MCNC: 131 MG/DL (ref 70–99)
HCT VFR BLD AUTO: 37.8 % (ref 35–47)
HGB BLD-MCNC: 12.5 G/DL (ref 11.7–15.7)
HOLD SPECIMEN: NORMAL
HOLD SPECIMEN: NORMAL
IMM GRANULOCYTES # BLD: 0 10E3/UL
IMM GRANULOCYTES NFR BLD: 1 %
INTERPRETATION ECG - MUSE: NORMAL
LYMPHOCYTES # BLD AUTO: 1.2 10E3/UL (ref 0.8–5.3)
LYMPHOCYTES NFR BLD AUTO: 41 %
MCH RBC QN AUTO: 27.7 PG (ref 26.5–33)
MCHC RBC AUTO-ENTMCNC: 33.1 G/DL (ref 31.5–36.5)
MCV RBC AUTO: 84 FL (ref 78–100)
MONOCYTES # BLD AUTO: 0.4 10E3/UL (ref 0–1.3)
MONOCYTES NFR BLD AUTO: 12 %
NEUTROPHILS # BLD AUTO: 1.2 10E3/UL (ref 1.6–8.3)
NEUTROPHILS NFR BLD AUTO: 41 %
NRBC # BLD AUTO: 0 10E3/UL
NRBC BLD AUTO-RTO: 0 /100
P AXIS - MUSE: 60 DEGREES
PLATELET # BLD AUTO: 195 10E3/UL (ref 150–450)
POTASSIUM SERPL-SCNC: 4.1 MMOL/L (ref 3.4–5.3)
PR INTERVAL - MUSE: 168 MS
QRS DURATION - MUSE: 94 MS
QT - MUSE: 364 MS
QTC - MUSE: 401 MS
R AXIS - MUSE: 62 DEGREES
RBC # BLD AUTO: 4.52 10E6/UL (ref 3.8–5.2)
SODIUM SERPL-SCNC: 140 MMOL/L (ref 136–145)
SYSTOLIC BLOOD PRESSURE - MUSE: NORMAL MMHG
T AXIS - MUSE: -24 DEGREES
VENTRICULAR RATE- MUSE: 73 BPM
WBC # BLD AUTO: 2.9 10E3/UL (ref 4–11)

## 2023-06-04 PROCEDURE — 36415 COLL VENOUS BLD VENIPUNCTURE: CPT | Performed by: EMERGENCY MEDICINE

## 2023-06-04 PROCEDURE — 85025 COMPLETE CBC W/AUTO DIFF WBC: CPT | Performed by: EMERGENCY MEDICINE

## 2023-06-04 PROCEDURE — 93005 ELECTROCARDIOGRAM TRACING: CPT

## 2023-06-04 PROCEDURE — 99285 EMERGENCY DEPT VISIT HI MDM: CPT | Mod: 25

## 2023-06-04 PROCEDURE — A9585 GADOBUTROL INJECTION: HCPCS | Performed by: EMERGENCY MEDICINE

## 2023-06-04 PROCEDURE — 70553 MRI BRAIN STEM W/O & W/DYE: CPT

## 2023-06-04 PROCEDURE — 255N000002 HC RX 255 OP 636: Performed by: EMERGENCY MEDICINE

## 2023-06-04 PROCEDURE — 80048 BASIC METABOLIC PNL TOTAL CA: CPT | Performed by: EMERGENCY MEDICINE

## 2023-06-04 RX ORDER — GADOBUTROL 604.72 MG/ML
10 INJECTION INTRAVENOUS ONCE
Status: COMPLETED | OUTPATIENT
Start: 2023-06-04 | End: 2023-06-04

## 2023-06-04 RX ORDER — AMLODIPINE BESYLATE 2.5 MG/1
2.5 TABLET ORAL DAILY
Qty: 30 TABLET | Refills: 0 | Status: SHIPPED | OUTPATIENT
Start: 2023-06-04

## 2023-06-04 RX ADMIN — GADOBUTROL 10 ML: 604.72 INJECTION INTRAVENOUS at 10:48

## 2023-06-04 ASSESSMENT — ACTIVITIES OF DAILY LIVING (ADL)
ADLS_ACUITY_SCORE: 35
ADLS_ACUITY_SCORE: 35

## 2023-06-04 NOTE — ED TRIAGE NOTES
Pt reports dizziness yesterday and Bilat lower leg weakness     Triage Assessment     Row Name 06/04/23 0757       Cardiac WDL    Cardiac WDL WDL       Peripheral/Neurovascular WDL    Peripheral Neurovascular WDL WDL       Cognitive/Neuro/Behavioral WDL    Cognitive/Neuro/Behavioral WDL WDL

## 2023-06-04 NOTE — ED PROVIDER NOTES
"  History     Chief Complaint:  Extremity Weakness       HPI   Fabienne Majano is a 58 year old female presents with an episode of dizziness/lightheadedness yesterday morning over 24 hours ago when she got up from bed.  She states she had to crawl to her bathroom.  Those symptoms have resolved however this morning when she awoke her bilateral feet were numb and tingly, this is now resolved.  She denies any vision, speech difficulties, denies any headaches, chest pain, shortness of breath.  She states her blood pressure was high yesterday when she checked it.  She weaned herself off of amlodipine approximately 6 to 7 months ago.      Independent Historian:   None - Patient Only    Review of External Notes:   Previous emergency department and primary care notes are reviewed      Medications:    acetaminophen (TYLENOL) 325 MG tablet  AMLODIPINE BENZOATE PO  ASPIRIN 81 PO  Calcium 600-400 MG-UNIT CHEW  Cetirizine HCl (ZYRTEC PO)  ketorolac (TORADOL) 10 MG tablet  Lidocaine (LIDOCARE) 4 % Patch        Past Medical History:    Past Medical History:   Diagnosis Date     Hypertension        Past Surgical History:    No past surgical history on file.     Physical Exam     Patient Vitals for the past 24 hrs:   BP Temp Temp src Pulse Resp SpO2 Height Weight   06/04/23 0744 (!) 156/73 97.8  F (36.6  C) Temporal 76 16 99 % 1.702 m (5' 7\") 95.3 kg (210 lb)        Physical Exam  General: Alert, interactive   Head:  Scalp is atraumatic  Eyes:  The pupils are equal, round, and reactive to light    EOM's intact    No scleral icterus  ENT:      Nose:  The external nose is normal  Ears:  External ears are normal  Mouth/Throat: Mucus membranes are moist       Neck:  Normal range of motion.      There is no rigidity.    Trachea is in the midline         CV:  Regular rate and rhythm    No murmur   Resp:  Breath sounds are clear bilaterally    Non-labored, no retractions or accessory muscle use      GI:  Abdomen is soft, no distension, no " tenderness.       MS:  Normal strength in all 4 extremities  Skin:  Warm and dry, No rash or lesions noted.  Neuro: NIH stroke scale score is 0    Strength 5/5 x4.  Sensation intact  In all 4 extremities.       Cranial nerves 2-12 grossly intact.    GCS: 15  Psych:  Awake. Alert.  Normal affect.      Appropriate interactions.    Emergency Department Course   ECG  ECG taken at 0803, ECG read at 0855  Normal sinus rhythm  Incomplete right bundle branch block  T wave abnormality, consider inferior ischemia   T wave is abnormality, consider anterior ischemia   Abnormal ECG   No changes as compared to prior, dated 09/11/21.  Rate 73 bpm. DE interval 168 ms. QRS duration 94 ms. QT/QTc 364/401 ms. P-R-T axes 60 62 -24.     Imaging:  MR Brain w/o & w Contrast   Final Result   IMPRESSION:      1.  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         Report per radiology    Laboratory:  Labs Ordered and Resulted from Time of ED Arrival to Time of ED Departure   BASIC METABOLIC PANEL - Abnormal       Result Value    Sodium 140      Potassium 4.1      Chloride 104      Carbon Dioxide (CO2) 25      Anion Gap 11      Urea Nitrogen 9.7      Creatinine 0.68      Calcium 9.5      Glucose 131 (*)     GFR Estimate >90     CBC WITH PLATELETS AND DIFFERENTIAL - Abnormal    WBC Count 2.9 (*)     RBC Count 4.52      Hemoglobin 12.5      Hematocrit 37.8      MCV 84      MCH 27.7      MCHC 33.1      RDW 13.3      Platelet Count 195      % Neutrophils 41      % Lymphocytes 41      % Monocytes 12      % Eosinophils 4      % Basophils 1      % Immature Granulocytes 1      NRBCs per 100 WBC 0      Absolute Neutrophils 1.2 (*)     Absolute Lymphocytes 1.2      Absolute Monocytes 0.4      Absolute Eosinophils 0.1      Absolute Basophils 0.0      Absolute Immature Granulocytes 0.0      Absolute NRBCs 0.0          Emergency Department Course &  Assessments:  Interventions:  Medications   gadobutrol (GADAVIST) injection 10 mL (10 mLs Intravenous $Given 6/4/23 1044)        Independent Interpretation (X-rays, CTs, rhythm strip):  None    Consultations/Discussion of Management or Tests:  None        Social Determinants of Health affecting care:   Healthcare Access/Compliance    Disposition:  The patient was discharged to home.     Impression & Plan    Medical Decision Making:  Following presentation history and physical examination are performed, the above work-up was undertaken.  She has no focal neurologic deficits at this time, MRI was undertaken demonstrating no signs of an acute stroke.  She states that she has been noncompliant with her amlodipine for approximately 6 to 7 months.  I have advised that she restart this medication and prescribed as noted below, I recommended close follow-up with her primary care provider to discuss further treatment.  She denies chest pain shortness of breath and I doubt acute coronary syndrome.  There is no signs of aortic dissection.  She felt comfortable discharge and was advised to return if new symptoms develop.    Diagnosis:    ICD-10-CM    1. Lightheadedness  R42       2. Paresthesias  R20.2       3. Hypertension, unspecified type  I10            Discharge Medications:  Discharge Medication List as of 6/4/2023 11:15 AM      START taking these medications    Details   amLODIPine (NORVASC) 2.5 MG tablet Take 1 tablet (2.5 mg) by mouth daily, Disp-30 tablet, R-0, Local Print                Kulwinder Luna MD  6/4/2023   Kulwinder Luna,*      Jeremy, Kulwinder Maldonado MD  06/04/23 [id number]

## 2023-11-25 ENCOUNTER — HEALTH MAINTENANCE LETTER (OUTPATIENT)
Age: 58
End: 2023-11-25

## 2024-10-04 ENCOUNTER — HOSPITAL ENCOUNTER (EMERGENCY)
Facility: CLINIC | Age: 59
Discharge: HOME | End: 2024-10-04
Attending: EMERGENCY MEDICINE | Admitting: EMERGENCY MEDICINE
Payer: COMMERCIAL

## 2024-10-04 VITALS
BODY MASS INDEX: 33.75 KG/M2 | TEMPERATURE: 97.6 F | HEIGHT: 66 IN | WEIGHT: 210 LBS | HEART RATE: 76 BPM | OXYGEN SATURATION: 99 % | RESPIRATION RATE: 16 BRPM | SYSTOLIC BLOOD PRESSURE: 143 MMHG | DIASTOLIC BLOOD PRESSURE: 87 MMHG

## 2024-10-04 DIAGNOSIS — H53.8 FLASHING LIGHTS: ICD-10-CM

## 2024-10-04 PROCEDURE — 99281 EMR DPT VST MAYX REQ PHY/QHP: CPT

## 2024-10-04 RX ORDER — AMLODIPINE BESYLATE 5 MG/1
2.5 TABLET ORAL DAILY
Qty: 30 TABLET | Refills: 0 | Status: SHIPPED | OUTPATIENT
Start: 2024-10-04 | End: 2024-10-10

## 2024-10-04 ASSESSMENT — VISUAL ACUITY
OD: 20/80;WITHOUT CORRECTIVE LENSES
OS: 20/80;WITHOUT CORRECTIVE LENSES
OU: 20/40;WITHOUT CORRECTIVE LENSES

## 2024-10-04 ASSESSMENT — ACTIVITIES OF DAILY LIVING (ADL)
ADLS_ACUITY_SCORE: 35
ADLS_ACUITY_SCORE: 35

## 2024-10-04 NOTE — DISCHARGE INSTRUCTIONS
We have done an ultrasound of your eyeball that shows no retinal detachment.  However this is only the central part of your retina.  Flashing lights and peripheral lights can be related to migraines without aura pain but we would like you to follow-up with a ophthalmologist to reassess your retina.  Return with both eye visual symptoms pain or other concerns.  Thanks for your patience today we are really filling your amlodipine due to elevated blood pressure.

## 2024-10-04 NOTE — ED TRIAGE NOTES
Pt c/o a halo in with vision in her left eye when she looks at lights, started yesterday, no trauma to area, no pain, ABCD intact.       Triage Assessment (Adult)       Row Name 10/04/24 0936          Triage Assessment    Airway WDL WDL        Respiratory WDL    Respiratory WDL WDL        Skin Circulation/Temperature WDL    Skin Circulation/Temperature WDL WDL        Cardiac WDL    Cardiac WDL WDL        Peripheral/Neurovascular WDL    Peripheral Neurovascular WDL WDL        Cognitive/Neuro/Behavioral WDL    Cognitive/Neuro/Behavioral WDL WDL

## 2024-10-04 NOTE — ED PROVIDER NOTES
"  Emergency Department Note      History of Present Illness     Chief Complaint   Eye Problem      HPI   Fabienne Majano is a 59 year old female with a history of hypertension presents to the ED for evaluation of an eye problem.     Patient is 59 and states over the last 24 hours is developed flashing lights in her peripheral vision.  She has no eye pain.  She has no double vision it is mostly out of her left eye.  Presents to the emergency room for assessment.    Independent Historian   None    Review of External Notes       Past Medical History     Medical History and Problem List   HTN    Medications   Albuterol  Amlodipine  Aspirin 81 mg    Physical Exam     Patient Vitals for the past 24 hrs:   BP Temp Temp src Pulse Resp SpO2 Height Weight   10/04/24 0936 (!) 150/84 97.6  F (36.4  C) Oral 77 16 99 % 1.676 m (5' 6\") 95.3 kg (210 lb)     Physical Exam  HENT:      Right Ear: Tympanic membrane normal.      Left Ear: Tympanic membrane normal.   Eyes:      Extraocular Movements: Extraocular movements intact.      Conjunctiva/sclera: Conjunctivae normal.      Pupils: Pupils are equal, round, and reactive to light.   Cardiovascular:      Rate and Rhythm: Normal rate.   Pulmonary:      Effort: Pulmonary effort is normal.   Neurological:      General: No focal deficit present.      Mental Status: She is alert and oriented to person, place, and time.           Diagnostics     Lab Results   Labs Ordered and Resulted from Time of ED Arrival to Time of ED Departure - No data to display    Imaging   No orders to display     Independent Interpretation   None    ED Course      Medications Administered   Medications - No data to display    Procedures   Procedures     Discussion of Management   None    ED Course   ED Course as of 10/04/24 1010   Fri Oct 04, 2024   1010 I obtained history and performed a physical exam as noted above.        Additional Documentation  None    Medical Decision Making / Diagnosis     CMS Diagnoses: " None    MIPS       None    [facility]   Fabienne Majano is a 59 year old female presents with flashing lights in her peripheral vision.  Bedside ultrasound does not identify retinal detachment.  Unsure if this is migraine aura without headache or if this could be wrong with tumor or peripheral retinal detachment.  Patient is encouraged to follow-up with ophthalmology for definitive retinal exam.  No concern for central nervous system event his symptoms are clearly monocular.  No concerns for amaurosis fugax as patient describes flashing lights without loss of vision.  Patient was discharged home in stable condition.      Disposition   The patient was discharged.     Diagnosis     ICD-10-CM    1. Flashing lights  H53.8            Discharge Medications   New Prescriptions    No medications on file         Scribe Disclosure:  Imelda HIDALGO, am serving as a scribe at 10:09 AM on 10/4/2024 to document services personally performed by Tr De La Cruz MD based on my observations and the provider's statements to me.        Tr De La Cruz MD  10/08/24 1835

## 2024-10-10 ENCOUNTER — OFFICE VISIT (OUTPATIENT)
Dept: FAMILY MEDICINE | Facility: CLINIC | Age: 59
End: 2024-10-10
Payer: COMMERCIAL

## 2024-10-10 VITALS
DIASTOLIC BLOOD PRESSURE: 81 MMHG | TEMPERATURE: 97.2 F | OXYGEN SATURATION: 100 % | BODY MASS INDEX: 34.39 KG/M2 | WEIGHT: 214 LBS | HEIGHT: 66 IN | RESPIRATION RATE: 20 BRPM | SYSTOLIC BLOOD PRESSURE: 132 MMHG | HEART RATE: 80 BPM

## 2024-10-10 DIAGNOSIS — H53.19 VISUAL HALO: Primary | ICD-10-CM

## 2024-10-10 DIAGNOSIS — I10 BENIGN ESSENTIAL HYPERTENSION: ICD-10-CM

## 2024-10-10 DIAGNOSIS — H26.9 CATARACT, UNSPECIFIED CATARACT TYPE, UNSPECIFIED LATERALITY: ICD-10-CM

## 2024-10-10 PROCEDURE — 99214 OFFICE O/P EST MOD 30 MIN: CPT | Performed by: NURSE PRACTITIONER

## 2024-10-10 RX ORDER — AMLODIPINE BESYLATE 2.5 MG/1
2.5 TABLET ORAL DAILY
Qty: 90 TABLET | Refills: 1 | Status: SHIPPED | OUTPATIENT
Start: 2024-10-10

## 2024-10-10 NOTE — PROGRESS NOTES
"  Assessment & Plan     Visual halo  Was just seen in the ED for this and had nice neg workup. Told to follow-up with ophthalmology which she did. Dx with cataracts.     Benign essential hypertension  Just restarted on meds. Refilled. Recommend follow-up in 6 months with PCP for recheck.   - amLODIPine (NORVASC) 2.5 MG tablet; Take 1 tablet (2.5 mg) by mouth daily.    Cataract, unspecified cataract type, unspecified laterality  Pt was recently seen by ophthalmology and told her symptoms likely r/t cataracts.           MED REC REQUIRED  Post Medication Reconciliation Status: patient was not discharged from an inpatient facility or TCU      Sena Loving is a 59 year old, presenting for the following health issues:  ER F/U (Patient is here for an ER follow up.  DOS 10/4/2024.)      10/10/2024     8:17 AM   Additional Questions   Roomed by Issac BOYKIN MA   Accompanied by Self     HPI       ED/UC Followup:    Facility:  [facility] Emergency Dept  Date of visit: 10/4/2024  Reason for visit: Eye Problem    Patient reported that she went to UC due to seeing halos. She was sent to the ER with concerns that she was having glaucoma. She then went to Eye doc and told she has cataracts and they believed this was the cause of the halos    She was seen in the ER and found to have elevated blood pressure. She was discharged with amlodipine 2.5 mg daily. Patient reported that she stopped taking amlodipine as she could not afford to go to the doctor. She notes she has a familial history of blood pressure and heart issues. She is motivated to do better. Patient reported her two brothers had a stoke. One  and one is alive delaying with disability related to the stoke. She wishes to avoid that issue.     The patient reported she made the appointment today as she has had a multitude of symptoms since Wednesday of the past week. She initially had \"a heart beat on her right arm.\" Then it progressed to a halo, then " "weakness and finally numbness in her tongue. Today she feels better but she has continued to have numbness in her tongue. She denied numbness or tingling anywhere else. No fatigue today. No other issue or concern.       Review of Systems  Detailed as above         Objective    /81 (BP Location: Right arm, Patient Position: Sitting, Cuff Size: Adult Large)   Pulse 80   Temp 97.2  F (36.2  C) (Temporal)   Resp 20   Ht 1.676 m (5' 6\")   Wt 97.1 kg (214 lb)   LMP  (LMP Unknown)   SpO2 100%   BMI 34.54 kg/m    Body mass index is 34.54 kg/m .  Physical Exam  Constitutional:       Appearance: She is well-developed.   Pulmonary:      Effort: Pulmonary effort is normal.   Neurological:      Mental Status: She is alert.   Psychiatric:         Judgment: Judgment normal.          I saw this patient in collaboration with Jerry Parker NP Student      I was present with the CAMELIA/PA student who participated in the service and in the documentation of the services provided. I have verified the history and personally performed the physical exam and medical decision making, as documented by the student and edited by me.     CAMELIA St, CNP              Signed Electronically by: CAMELIA Hoyt CNP    "

## 2024-11-03 ENCOUNTER — HEALTH MAINTENANCE LETTER (OUTPATIENT)
Age: 59
End: 2024-11-03

## 2024-11-26 ENCOUNTER — OFFICE VISIT (OUTPATIENT)
Dept: URGENT CARE | Facility: URGENT CARE | Age: 59
End: 2024-11-26
Payer: COMMERCIAL

## 2024-11-26 VITALS
SYSTOLIC BLOOD PRESSURE: 146 MMHG | RESPIRATION RATE: 16 BRPM | HEART RATE: 111 BPM | TEMPERATURE: 98.5 F | OXYGEN SATURATION: 97 % | DIASTOLIC BLOOD PRESSURE: 73 MMHG

## 2024-11-26 DIAGNOSIS — R05.1 ACUTE COUGH: ICD-10-CM

## 2024-11-26 DIAGNOSIS — K04.7 DENTAL INFECTION: ICD-10-CM

## 2024-11-26 DIAGNOSIS — J06.9 VIRAL URI WITH COUGH: Primary | ICD-10-CM

## 2024-11-26 DIAGNOSIS — R07.0 THROAT PAIN: ICD-10-CM

## 2024-11-26 LAB
DEPRECATED S PYO AG THROAT QL EIA: NEGATIVE
FLUAV AG SPEC QL IA: NEGATIVE
FLUBV AG SPEC QL IA: NEGATIVE
GROUP A STREP BY PCR: NOT DETECTED

## 2024-11-26 PROCEDURE — 87651 STREP A DNA AMP PROBE: CPT

## 2024-11-26 PROCEDURE — 87804 INFLUENZA ASSAY W/OPTIC: CPT

## 2024-11-26 NOTE — PROGRESS NOTES
ASSESSMENT:  (J06.9) Viral URI with cough  (primary encounter diagnosis)    (R05.1) Acute cough  Plan: Influenza A & B Antigen - Clinic Collect    (R07.0) Throat pain  Plan: Streptococcus A Rapid Screen w/Reflex to PCR -         Clinic Collect, Influenza A & B Antigen -         Clinic Collect, Group A Streptococcus PCR         Throat Swab    (K04.7) Dental infection  Plan: amoxicillin-clavulanate (AUGMENTIN) 875-125 MG         tablet    PLAN:  Informed the patient that the strep and influenza test are negative and that her symptoms are related to a viral illness pending the strep PCR result.  We discussed the need for her to get plenty rest, drink fluids and use Tylenol as needed for pain with the maximum dose being 4000 mg in a 24-hour period of time.  Discussed the need to take the antibiotic as prescribed and finish the full course even if symptoms improve for her dental infection.  We also discussed trying yogurt with active cultures or probiotic such as Culturelle daily to help event diarrhea while taking the antibiotic.  Instructed the patient to follow-up with her dentist for further evaluation and treatment.  Patient acknowledged her understanding of the above plan.    In addition to the urgent care visit for the patient's viral illness symptoms, 25  minutes of the appointment were spent evaluating and developing a treatment plan for her additional concern regarding a dental infection for a total visit time of 45 minutes.     The use of Dragon[employer] dictation services may have been used to construct the content in this note; any grammatical or spelling errors are non-intentional. Please contact the author of this note directly if you are in need of any clarification.      CAMELIA Bassett CNP      SUBJECTIVE:   Fabienne Majano is a 59 year old female presenting with a chief complaint of runny nose, cough - non-productive, and nausea.  Onset of symptoms was 5 day(s) ago.  Course of illness is same.     Patient denies: ear pain, sore throat, vomiting, and diarrhea  Treatment measures tried include Pepto Bismol, Mucinex, Sudafed, ibuprofen and cetrizine.  Predisposing factors include toothache from a broken tooth that will sometimes bleed per the patient.  She is concerned her tooth may be infected.      At COVID test negative.     ROS:  Negative except noted above.    OBJECTIVE:  BP (!) 146/73 (BP Location: Left arm, Patient Position: Sitting, Cuff Size: Adult Regular)   Pulse 111   Temp 98.5  F (36.9  C) (Tympanic)   Resp 16   LMP  (LMP Unknown)   SpO2 97%   GENERAL APPEARANCE: healthy, alert and no distress  EYES: EOMI,  PERRL, conjunctiva clear  HENT: nose and mouth without erythema, ulcers or lesions and first upper right molar cracked with surrounding gingival erythema and edema.  No drainage or bleeding noted.    NECK: supple, nontender, no lymphadenopathy  RESP: lungs clear to auscultation - no rales, rhonchi or wheezes  CV: regular rates and rhythm, normal S1 S2, no murmur noted  SKIN: no suspicious lesions or rashes    Rapid Strep test: Negative

## 2024-11-26 NOTE — PATIENT INSTRUCTIONS
Strep and influenza tests are negative.  Get plenty of rest and drink fluids.  Can use Tylenol as needed for pain.  Maximum dose of Tylenol is 4000mg in a 24 hour period of time.  Take the antibiotic as prescribed and finish the full course even if symptoms improve.  Try yogurt with active cultures or probiotics such as Culturelle daily to help prevent diarrhea while using antibiotics.  Follow up with your dentist for further evaluation and treatment.

## 2024-12-29 ENCOUNTER — HEALTH MAINTENANCE LETTER (OUTPATIENT)
Age: 59
End: 2024-12-29

## 2025-06-23 DIAGNOSIS — I10 BENIGN ESSENTIAL HYPERTENSION: ICD-10-CM

## 2025-06-23 RX ORDER — AMLODIPINE BESYLATE 2.5 MG/1
2.5 TABLET ORAL DAILY
Qty: 90 TABLET | Refills: 1 | Status: SHIPPED | OUTPATIENT
Start: 2025-06-23